# Patient Record
Sex: OTHER/UNKNOWN | Race: OTHER | NOT HISPANIC OR LATINO | URBAN - METROPOLITAN AREA
[De-identification: names, ages, dates, MRNs, and addresses within clinical notes are randomized per-mention and may not be internally consistent; named-entity substitution may affect disease eponyms.]

---

## 2018-03-16 ENCOUNTER — APPOINTMENT (RX ONLY)
Dept: URBAN - METROPOLITAN AREA CLINIC 48 | Facility: CLINIC | Age: 33
Setting detail: DERMATOLOGY
End: 2018-03-16

## 2018-03-16 DIAGNOSIS — Z41.9 ENCOUNTER FOR PROCEDURE FOR PURPOSES OTHER THAN REMEDYING HEALTH STATE, UNSPECIFIED: ICD-10-CM

## 2018-03-16 PROCEDURE — ? LASER HAIR REMOVAL

## 2018-03-16 NOTE — PROCEDURE: LASER HAIR REMOVAL
Fluence (Will Not Render If 0): 12
Treatment Number: 0
Fluence (Will Not Render If 0): 10
Fluence (Will Not Render If 0): [address] [facility], [place]
Pulse Duration: 10 ms
Spot Size: 18 mm
Fluence (Will Not Render If 0): 20
Cooling: DCD 40/20
Consent: Written consent obtained, risks reviewed including but not limited to crusting, scabbing, blistering, scarring, darker or lighter pigmentary change, paradoxical hair regrowth, incomplete removal of hair and infection.
Anesthesia Type: 1% lidocaine with epinephrine
Pre-Procedure: Prior to proceeding the treatment areas were cleaned and all present put on their eye protection.
Laser Type: Nd:Yag 1064nm
Render Post-Care In The Note: No
Detail Level: Detailed
Post-Care Instructions: I reviewed with the patient in detail post-care instructions. Patient should avoid sun for a minimum of 4 weeks before and after treatment.
Post-Procedure Care: Immediate endpoint: perifollicular erythema and edema. Vaseline and ice applied. Post care reviewed with patient.
Tolerated Procedure (Optional): Tolerated Well

## 2018-04-16 ENCOUNTER — APPOINTMENT (RX ONLY)
Dept: URBAN - METROPOLITAN AREA CLINIC 48 | Facility: CLINIC | Age: 33
Setting detail: DERMATOLOGY
End: 2018-04-16

## 2018-04-16 DIAGNOSIS — Z41.9 ENCOUNTER FOR PROCEDURE FOR PURPOSES OTHER THAN REMEDYING HEALTH STATE, UNSPECIFIED: ICD-10-CM

## 2018-04-16 PROCEDURE — ? LASER HAIR REMOVAL

## 2018-04-16 NOTE — PROCEDURE: LASER HAIR REMOVAL
Post-Care Instructions: I reviewed with the patient in detail post-care instructions. Patient should avoid sun for a minimum of 4 weeks before and after treatment.
External Cooling Fan Speed: 0
Spot Size: 18 mm
Cooling: DCD 40/20
Detail Level: Detailed
Pre-Procedure: Prior to proceeding the treatment areas were cleaned and all present put on their eye protection.
Laser Type: Nd:Yag 1064nm
Fluence (Will Not Render If 0): [address] [facility], [place], operated by [facility]
Anesthesia Type: 1% lidocaine with epinephrine
Fluence (Will Not Render If 0): 10
Fluence (Will Not Render If 0): 20
Post-Procedure Care: Immediate endpoint: perifollicular erythema and edema. Vaseline and ice applied. Post care reviewed with patient.
Tolerated Procedure (Optional): Tolerated Well
Consent: Written consent obtained, risks reviewed including but not limited to crusting, scabbing, blistering, scarring, darker or lighter pigmentary change, paradoxical hair regrowth, incomplete removal of hair and infection.
Pulse Duration: 10 ms
Fluence (Will Not Render If 0): [address] [facility]
Render Post-Care In The Note: No

## 2018-05-14 ENCOUNTER — APPOINTMENT (RX ONLY)
Dept: URBAN - METROPOLITAN AREA CLINIC 48 | Facility: CLINIC | Age: 33
Setting detail: DERMATOLOGY
End: 2018-05-14

## 2018-05-14 DIAGNOSIS — Z41.9 ENCOUNTER FOR PROCEDURE FOR PURPOSES OTHER THAN REMEDYING HEALTH STATE, UNSPECIFIED: ICD-10-CM

## 2018-05-14 PROCEDURE — ? LASER HAIR REMOVAL

## 2018-05-14 NOTE — PROCEDURE: LASER HAIR REMOVAL
Treatment Number: 0
Spot Size: 18 mm
Cooling: DCD 40/20
Fluence (Will Not Render If 0): [address] [facility]
Post-Procedure Care: Immediate endpoint: perifollicular erythema and edema. Vaseline and ice applied. Post care reviewed with patient.
Post-Care Instructions: I reviewed with the patient in detail post-care instructions. Patient should avoid sun for a minimum of 4 weeks before and after treatment.
Pulse Duration: 10 ms
Fluence (Will Not Render If 0): [address] [facility]
Consent: Written consent obtained, risks reviewed including but not limited to crusting, scabbing, blistering, scarring, darker or lighter pigmentary change, paradoxical hair regrowth, incomplete removal of hair and infection.
Anesthesia Type: 1% lidocaine with epinephrine
Render Post-Care In The Note: No
Tolerated Procedure (Optional): Tolerated Well
Pre-Procedure: Prior to proceeding the treatment areas were cleaned and all present put on their eye protection.
Detail Level: Detailed
Fluence (Will Not Render If 0): 10
Laser Type: Nd:Yag 1064nm
Fluence (Will Not Render If 0): 20

## 2018-06-11 ENCOUNTER — APPOINTMENT (RX ONLY)
Dept: URBAN - METROPOLITAN AREA CLINIC 48 | Facility: CLINIC | Age: 33
Setting detail: DERMATOLOGY
End: 2018-06-11

## 2018-06-11 DIAGNOSIS — Z41.9 ENCOUNTER FOR PROCEDURE FOR PURPOSES OTHER THAN REMEDYING HEALTH STATE, UNSPECIFIED: ICD-10-CM

## 2018-06-11 PROCEDURE — ? LASER HAIR REMOVAL

## 2018-06-11 NOTE — PROCEDURE: LASER HAIR REMOVAL
Cooling: DCD 40/20
Number Of Prepaid Treatments (Will Not Render If 0): 0
Fluence (Will Not Render If 0): [address] [facility]
Consent: Written consent obtained, risks reviewed including but not limited to crusting, scabbing, blistering, scarring, darker or lighter pigmentary change, paradoxical hair regrowth, incomplete removal of hair and infection.
Pre-Procedure: Prior to proceeding the treatment areas were cleaned and all present put on their eye protection.
Laser Type: Nd:Yag 1064nm
Detail Level: Detailed
Spot Size: 18 mm
Post-Care Instructions: I reviewed with the patient in detail post-care instructions. Patient should avoid sun for a minimum of 4 weeks before and after treatment.
Fluence (Will Not Render If 0): 20
Anesthesia Type: 1% lidocaine with epinephrine
Pulse Duration: 10 ms
Tolerated Procedure (Optional): Tolerated Well
Post-Procedure Care: Immediate endpoint: perifollicular erythema and edema. Vaseline and ice applied. Post care reviewed with patient.
Render Post-Care In The Note: No
Fluence (Will Not Render If 0): 10
Fluence (Will Not Render If 0): [address] [facility]

## 2018-07-13 ENCOUNTER — APPOINTMENT (RX ONLY)
Dept: URBAN - METROPOLITAN AREA CLINIC 48 | Facility: CLINIC | Age: 33
Setting detail: DERMATOLOGY
End: 2018-07-13

## 2018-07-13 DIAGNOSIS — Z41.9 ENCOUNTER FOR PROCEDURE FOR PURPOSES OTHER THAN REMEDYING HEALTH STATE, UNSPECIFIED: ICD-10-CM

## 2018-07-13 PROCEDURE — ? LASER HAIR REMOVAL

## 2018-07-13 NOTE — PROCEDURE: LASER HAIR REMOVAL
Cooling: DCD 30/20
External Cooling Fan Speed: 0
Laser Type: Nd:Yag 1064nm
Fluence (Will Not Render If 0): [address] [facility]
Spot Size: 18 mm
Location Override: upper lip (14j) and chin (16j)
Tolerated Procedure (Optional): Tolerated Well
Anesthesia Type: 1% lidocaine with epinephrine
Consent: Written consent obtained, risks reviewed including but not limited to crusting, scabbing, blistering, scarring, darker or lighter pigmentary change, paradoxical hair regrowth, incomplete removal of hair and infection.
Fluence (Will Not Render If 0): 20
Cooling: DCD 40/20
Post-Procedure Care: Immediate endpoint: perifollicular erythema and edema. Vaseline and ice applied. Post care reviewed with patient.
Pulse Duration: 10 ms
Fluence (Will Not Render If 0): [address] [facility]
Render Post-Care In The Note: No
Post-Care Instructions: I reviewed with the patient in detail post-care instructions. Patient should avoid sun for a minimum of 4 weeks before and after treatment.
Fluence (Will Not Render If 0): 10
Pre-Procedure: Prior to proceeding the treatment areas were cleaned and all present put on their eye protection.
Detail Level: Detailed

## 2018-08-17 ENCOUNTER — APPOINTMENT (RX ONLY)
Dept: URBAN - METROPOLITAN AREA CLINIC 48 | Facility: CLINIC | Age: 33
Setting detail: DERMATOLOGY
End: 2018-08-17

## 2018-08-17 DIAGNOSIS — Z41.9 ENCOUNTER FOR PROCEDURE FOR PURPOSES OTHER THAN REMEDYING HEALTH STATE, UNSPECIFIED: ICD-10-CM

## 2018-08-17 PROCEDURE — ? LASER HAIR REMOVAL

## 2018-08-17 NOTE — PROCEDURE: LASER HAIR REMOVAL
Fluence (Will Not Render If 0): [address] [facility], [place], operated by [facility]
Number Of Prepaid Treatments (Will Not Render If 0): 0
Detail Level: Detailed
Post-Care Instructions: I reviewed with the patient in detail post-care instructions. Patient should avoid sun for a minimum of 4 weeks before and after treatment.
Fluence (Will Not Render If 0): 20
Cooling: DCD 30/20
Spot Size: 18 mm
Pre-Procedure: Prior to proceeding the treatment areas were cleaned and all present put on their eye protection.
Fluence (Will Not Render If 0): [address] [place] [address]
Post-Procedure Care: Immediate endpoint: perifollicular erythema and edema. Vaseline and ice applied. Post care reviewed with patient.
Pulse Duration: 10 ms
Render Post-Care In The Note: No
Laser Type: Nd:Yag 1064nm
Location Override: upper lip (14j) and chin (18j)
Anesthesia Type: 1% lidocaine with epinephrine
Consent: Written consent obtained, risks reviewed including but not limited to crusting, scabbing, blistering, scarring, darker or lighter pigmentary change, paradoxical hair regrowth, incomplete removal of hair and infection.